# Patient Record
Sex: MALE | Race: NATIVE HAWAIIAN OR OTHER PACIFIC ISLANDER | Employment: UNEMPLOYED | URBAN - NONMETROPOLITAN AREA
[De-identification: names, ages, dates, MRNs, and addresses within clinical notes are randomized per-mention and may not be internally consistent; named-entity substitution may affect disease eponyms.]

---

## 2020-01-21 ENCOUNTER — HOSPITAL ENCOUNTER (EMERGENCY)
Age: 65
Discharge: HOME | End: 2020-01-21
Attending: FAMILY MEDICINE
Payer: MEDICARE

## 2020-01-21 ENCOUNTER — APPOINTMENT (OUTPATIENT)
Dept: GENERAL RADIOLOGY | Age: 65
End: 2020-01-21
Payer: MEDICARE

## 2020-01-21 VITALS
HEIGHT: 68 IN | SYSTOLIC BLOOD PRESSURE: 116 MMHG | DIASTOLIC BLOOD PRESSURE: 71 MMHG | TEMPERATURE: 97.5 F | RESPIRATION RATE: 18 BRPM | HEART RATE: 88 BPM | WEIGHT: 120 LBS | BODY MASS INDEX: 18.19 KG/M2 | OXYGEN SATURATION: 94 %

## 2020-01-21 PROCEDURE — 6370000000 HC RX 637 (ALT 250 FOR IP): Performed by: FAMILY MEDICINE

## 2020-01-21 PROCEDURE — 73590 X-RAY EXAM OF LOWER LEG: CPT

## 2020-01-21 PROCEDURE — 99283 EMERGENCY DEPT VISIT LOW MDM: CPT

## 2020-01-21 PROCEDURE — 73630 X-RAY EXAM OF FOOT: CPT

## 2020-01-21 PROCEDURE — 73610 X-RAY EXAM OF ANKLE: CPT

## 2020-01-21 RX ORDER — OXYCODONE HYDROCHLORIDE AND ACETAMINOPHEN 5; 325 MG/1; MG/1
1 TABLET ORAL EVERY 6 HOURS PRN
Qty: 12 TABLET | Refills: 0 | Status: SHIPPED | OUTPATIENT
Start: 2020-01-21 | End: 2020-01-21

## 2020-01-21 RX ORDER — OXYCODONE HYDROCHLORIDE AND ACETAMINOPHEN 5; 325 MG/1; MG/1
1 TABLET ORAL ONCE
Status: COMPLETED | OUTPATIENT
Start: 2020-01-21 | End: 2020-01-21

## 2020-01-21 RX ORDER — OXYCODONE HYDROCHLORIDE AND ACETAMINOPHEN 5; 325 MG/1; MG/1
1 TABLET ORAL EVERY 6 HOURS PRN
Qty: 12 TABLET | Refills: 0 | Status: SHIPPED | OUTPATIENT
Start: 2020-01-21 | End: 2020-01-24

## 2020-01-21 RX ADMIN — OXYCODONE HYDROCHLORIDE AND ACETAMINOPHEN 1 TABLET: 5; 325 TABLET ORAL at 05:26

## 2020-01-21 ASSESSMENT — PAIN SCALES - GENERAL
PAINLEVEL_OUTOF10: 10
PAINLEVEL_OUTOF10: 10

## 2020-01-21 ASSESSMENT — PAIN DESCRIPTION - FREQUENCY: FREQUENCY: CONTINUOUS

## 2020-01-21 ASSESSMENT — PAIN DESCRIPTION - DESCRIPTORS: DESCRIPTORS: ACHING;STABBING;BURNING

## 2020-01-21 ASSESSMENT — PAIN DESCRIPTION - PAIN TYPE: TYPE: ACUTE PAIN

## 2020-01-21 ASSESSMENT — PAIN DESCRIPTION - ORIENTATION: ORIENTATION: LEFT

## 2020-01-21 ASSESSMENT — PAIN DESCRIPTION - LOCATION: LOCATION: ANKLE;FOOT

## 2020-01-21 NOTE — ED PROVIDER NOTES
[address] [facility]  eMERGENCY dEPARTMENT eNCOUnter          [address] [facility]       Chief Complaint   Patient presents with    Foot Pain     Pt states that he was trying not to step on his dog and twisted his left foot/ankle yesterday;        Nurses Notes reviewed and I agree except as noted in the HPI. HISTORY OF PRESENT ILLNESS    Tamar Ohara is a 62 y.o. male who presents emergency room via private vehicle, patient states driven by friend, patient complaining of left ankle pain and swelling after tripping trying to avoid stepping on his dog day prior, awoke this morning with significant pain and swelling of the left ankle area. Patient has a history of surgery in his ankle/foot area including hardware placement. Patient rates the pain 10 out of 10, aching and stabbing, worse with any movement. Denies other injury. REVIEW OF SYSTEMS     Review of Systems   All other systems reviewed and are negative. PAST MEDICAL HISTORY    has a past medical history of Arthritis, Asthma, CAD (coronary artery disease), COPD (chronic obstructive pulmonary disease) ([facility] [address] and Nerve damage. SURGICAL HISTORY      has a past surgical history that includes back surgery; Bunionectomy (Left); Rotator cuff repair (Right); joint replacement (Right); hiatal hernia repair; and Abdomen surgery. CURRENT MEDICATIONS       Previous Medications    No medications on file       ALLERGIES     is allergic to aspirin. FAMILY HISTORY     has no family status information on file. family history is not on file. SOCIAL HISTORY      reports that he has been smoking. He has a 40.00 pack-year smoking history. He has never used smokeless tobacco. He reports previous drug use. Drug: Marijuana. He reports that he does not drink alcohol. PHYSICAL EXAM     INITIAL VITALS:  height is 5' 8\" (1.727 m) and weight is 120 lb (54.4 kg). His oral temperature is 97.5 °F (36.4 °C).  His blood pressure is 116/71 and his pulse is 88. His respiration is 18 and oxygen saturation is 94%. Physical Exam   Constitutional: Patient is oriented to person, place, and time. Patient appears well-developed and well-nourished. Patient is active and cooperative. Neck: Full passive range of motion without pain and phonation normal.   Cardiovascular:  Normal rate, regular rhythm and intact distal pulses. Pulses: Right radial pulse  2+   Pulmonary/Chest: Effort normal. No tachypnea and no bradypnea. Abdominal:  Patient without distension  Musculoskeletal:   Focused examination of patient's left lower extremity shows obvious swelling to the lateral aspect of the ankle and foot, extending over the dorsal and midfoot area, tenderness to palpation over the proximal fibula, lateral malleolus, and lateral aspect of the midfoot, no overlying integument aberration, distal foot is warm    Except as otherwise noted, negative acute trauma or deformity,  apparent full range of motion and normal strength all extremities appropriate to age. Neurological: Patient is alert and oriented to person, place, and time. patient displays no tremor. Patient displays no seizure activity. .    Skin: Skin is warm and dry. Patient is not diaphoretic. Psychiatric: Patient has a normal mood and affect. Patient speech is normal and behavior is normal. Cognition and memory are normal.    DIFFERENTIAL DIAGNOSIS:   Fracture sprain strain contusion, hardware displacement    DIAGNOSTIC RESULTS           RADIOLOGY: non-plain film images(s) such as CT, Ultrasound and MRI are read by the radiologist.  XR TIBIA FIBULA LEFT (2 VIEWS)   Preliminary Result   Preliminary report only      IMPRESSION:       No acute fracture or dislocation of the left tibia or fibula is seen. If pain    persists, repeat radiographs recommended in 7-10 days. XR ANKLE LEFT (MIN 3 VIEWS)   Preliminary Result   Preliminary report only      IMPRESSION:       1.  Post surgical changes of are limitations from the emergency room, which this can be prescribed. Give patient 1 Percocet prior to discharge. FINAL IMPRESSION      1. Sprain of left ankle, unspecified ligament, initial encounter          DISPOSITION/PLAN   Discharge    PATIENT REFERRED TO:  Grant Barry DPM  [address] [facility] [address], [facility] [address]    Call       Kojo Andrews MD  [address]  [phone]    Call   As needed      DISCHARGE MEDICATIONS:  New Prescriptions    OXYCODONE-ACETAMINOPHEN (PERCOCET) 5-325 MG PER TABLET    Take 1 tablet by mouth every 6 hours as needed for Pain for up to 3 days. Intended supply: 3 days. Take lowest dose possible to manage pain           Summation      Patient Course: Discharge    ED Medications administered this visit:    Medications   oxyCODONE-acetaminophen (PERCOCET) 5-325 MG per tablet 1 tablet (has no administration in time range)       New Prescriptions from this visit:    New Prescriptions    OXYCODONE-ACETAMINOPHEN (PERCOCET) 5-325 MG PER TABLET    Take 1 tablet by mouth every 6 hours as needed for Pain for up to 3 days. Intended supply: 3 days. Take lowest dose possible to manage pain       Follow-up:  Grant JAVIER Barry  [address] [facility] [address], [facility] [address] [id number]  [phone]    Call       Kojo Andrews MD  [address]  [phone]    Call   As needed        Final Impression:   1.  Sprain of left ankle, unspecified ligament, initial encounter               (Please note that portions of this note were completed with a voice recognition program.  Efforts were made to edit the dictations but occasionally words are mis-transcribed.)    MD Matt Galicia MD  01/21/20 6033

## 2020-03-01 ENCOUNTER — HOSPITAL ENCOUNTER (EMERGENCY)
Age: 65
Discharge: HOME | End: 2020-03-01
Attending: EMERGENCY MEDICINE
Payer: MEDICARE

## 2020-03-01 ENCOUNTER — APPOINTMENT (OUTPATIENT)
Dept: CT IMAGING | Age: 65
End: 2020-03-01
Payer: MEDICARE

## 2020-03-01 VITALS
HEART RATE: 94 BPM | SYSTOLIC BLOOD PRESSURE: 145 MMHG | RESPIRATION RATE: 16 BRPM | DIASTOLIC BLOOD PRESSURE: 76 MMHG | TEMPERATURE: 98.6 F | BODY MASS INDEX: 16.22 KG/M2 | HEIGHT: 68 IN | WEIGHT: 107 LBS | OXYGEN SATURATION: 92 %

## 2020-03-01 PROCEDURE — 96372 THER/PROPH/DIAG INJ SC/IM: CPT

## 2020-03-01 PROCEDURE — 72125 CT NECK SPINE W/O DYE: CPT

## 2020-03-01 PROCEDURE — 99284 EMERGENCY DEPT VISIT MOD MDM: CPT

## 2020-03-01 PROCEDURE — 6360000002 HC RX W HCPCS: Performed by: EMERGENCY MEDICINE

## 2020-03-01 RX ORDER — CYCLOBENZAPRINE HCL 5 MG
5 TABLET ORAL 2 TIMES DAILY PRN
Qty: 10 TABLET | Refills: 0 | Status: SHIPPED | OUTPATIENT
Start: 2020-03-01 | End: 2020-03-21

## 2020-03-01 RX ORDER — KETOROLAC TROMETHAMINE 30 MG/ML
30 INJECTION, SOLUTION INTRAMUSCULAR; INTRAVENOUS ONCE
Status: COMPLETED | OUTPATIENT
Start: 2020-03-01 | End: 2020-03-01

## 2020-03-01 RX ADMIN — KETOROLAC TROMETHAMINE 30 MG: 30 INJECTION, SOLUTION INTRAMUSCULAR at 08:43

## 2020-03-01 ASSESSMENT — PAIN SCALES - GENERAL
PAINLEVEL_OUTOF10: 10
PAINLEVEL_OUTOF10: 10

## 2020-03-01 ASSESSMENT — PAIN DESCRIPTION - ONSET: ONSET: ON-GOING

## 2020-03-01 ASSESSMENT — PAIN DESCRIPTION - PAIN TYPE: TYPE: ACUTE PAIN

## 2020-03-01 ASSESSMENT — PAIN DESCRIPTION - LOCATION: LOCATION: NECK

## 2020-03-01 ASSESSMENT — PAIN DESCRIPTION - PROGRESSION: CLINICAL_PROGRESSION: NOT CHANGED

## 2020-03-01 ASSESSMENT — PAIN DESCRIPTION - FREQUENCY: FREQUENCY: CONTINUOUS

## 2020-03-01 ASSESSMENT — PAIN DESCRIPTION - DESCRIPTORS: DESCRIPTORS: SHARP;STABBING

## 2020-03-01 NOTE — ED PROVIDER NOTES
no rubs   GI:  Soft, nondistended, nontender, no organomegaly, no mass, no rebound, no guarding   :  No costovertebral angle tenderness   Musculoskeletal:  No edema, no tenderness, no deformities. Back- tender to touch   Integument:  Well hydrated   Neurologic: Decrease sensation both feet and hands/fingers, baseline       RADIOLOGY/PROCEDURES    Preliminary report only       IMPRESSION:            1. No fracture of the cervical spine is seen. 2. 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 3. There is multilevel foraminal narrowing at the C3-C4 through C7-T1 level    secondary to uncovertebral arthropathy. 4. Prior ACDF at the C3-C4 through C4-C5 level without evidence of hardware    complication. There is complete osseous fusion across these levels. 5. 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 However, there is    osseous fusion across this level and there is therefore no underlying    instability at this level. Summation      Patient Course: CT scan neck negative, no neurological deficits or worsening neuropathy, toradol 30mg IM given. Rx Flexiril 5mg for home. Discharged to home. Discussed warning signs and told to return for worsening symptoms. ED Medications administered this visit:    Medications   ketorolac (TORADOL) injection 30 mg (30 mg Intramuscular Given 3/1/20 0843)       New Prescriptions from this visit:    New Prescriptions    CYCLOBENZAPRINE (FLEXERIL) 5 MG TABLET    Take 1 tablet by mouth 2 times daily as needed for Muscle spasms       Follow-up:  pcp      return If symptoms worsen        Final Impression:   1.  Neck pain Stable              (Please note that portions of this note were completed with a voice recognition program.  Efforts were made to edit the dictations but occasionally words are mis-transcribed.)      Manfred Bull MD  03/01/20 9177

## 2023-03-09 ENCOUNTER — HOSPITAL ENCOUNTER (OUTPATIENT)
Age: 68
Discharge: HOME | End: 2023-03-09
Payer: MEDICARE

## 2023-03-09 LAB
ERYTHROCYTE [SEDIMENTATION RATE] IN BLOOD BY WESTERGREN METHOD: 19 MM/HR (ref 0–20)
URATE SERPL-MCNC: 6.2 MG/DL (ref 3.4–7)
WBC # BLD AUTO: 9 K/UL (ref 3.5–11)

## 2023-03-09 PROCEDURE — 85048 AUTOMATED LEUKOCYTE COUNT: CPT

## 2023-03-09 PROCEDURE — 36415 COLL VENOUS BLD VENIPUNCTURE: CPT

## 2023-03-09 PROCEDURE — 85652 RBC SED RATE AUTOMATED: CPT

## 2023-03-09 PROCEDURE — 84550 ASSAY OF BLOOD/URIC ACID: CPT

## 2025-01-10 LAB
NON-UH HIE ALANINE AMINOTRANSFERASE:CCNC:PT:SER/PLAS:QN:NO ADDITION OF P-5': 30 INT._UNIT/L (ref 6–46)
NON-UH HIE ALBUMIN/GLOBULIN:MCRTO:PT:SER:QN:: 1.1 (ref 1.1–2.2)
NON-UH HIE ALBUMIN:MCNC:PT:SER/PLAS:QN:: 3 GM/DL (ref 3.3–5)
NON-UH HIE ALKALINE PHOSPHATASE:CCNC:PT:SER/PLAS:QN:: 130 INT._UNIT/L (ref 21–98)
NON-UH HIE ANION GAP:SCNC:PT:SER/PLAS:QN:: 9 MEQ/L (ref 6–16)
NON-UH HIE ASPARTATE AMINOTRANSFERASE:CCNC:PT:SER/PLAS:QN:: 94 INT._UNIT/L (ref 5–43)
NON-UH HIE BASOPHILS/LEUKOCYTES:NFR.DF:PT:BLD:QN:AUTOMATED COUNT: 0.1 E9/L (ref 0–0.2)
NON-UH HIE BASOPHILS:NCNC:PT:BLD:QN:AUTOMATED COUNT: 1.1 % (ref 0–2)
NON-UH HIE BILIRUBIN.GLUCURONIDATED+BILIRUBIN.ALBUMIN BOUND:MCNC:PT:SER/PLA: 0.2 MG/DL (ref 0–0.4)
NON-UH HIE BILIRUBIN.NON-GLUCURONIDATED:MSCNC:PT:SER/PLAS:QN:: 0.5 MG/DL (ref 0.1–0.9)
NON-UH HIE BILIRUBIN:MCNC:PT:SER/PLAS:QN:: 0.7 MG/DL (ref 0–1.1)
NON-UH HIE CALCIUM:MCNC:PT:SER/PLAS:QN:: 8 MG/DL (ref 8.9–11.1)
NON-UH HIE CARBON DIOXIDE:SCNC:PT:SER/PLAS:QN:: 29 MMOL/L (ref 21–31)
NON-UH HIE CHLORIDE:SCNC:PT:SER/PLAS:QN:: 98 MMOL/L (ref 101–111)
NON-UH HIE COAGULATION SURFACE INDUCED:TIME:PT:PPP:QN:COAG: 40.9 SECOND(S) (ref 25.1–36.5)
NON-UH HIE COAGULATION TISSUE FACTOR INDUCED.INR:RELTIME:PT:PPP:QN:COAG: 1.25
NON-UH HIE COAGULATION TISSUE FACTOR INDUCED:TIME:PT:PPP:QN:COAG: 14 SECOND(S) (ref 9.4–12.5)
NON-UH HIE CREATININE:MCNC:PT:SER/PLAS:QN:: 0.8 MG/DL (ref 0.5–1.3)
NON-UH HIE EGFR: 96 ML/MIN/1.73 M2
NON-UH HIE EOSINOPHILS/100 LEUKOCYTES:NFR:PT:BLD:QN:AUTOMATED COUNT: 1.9 % (ref 0–8)
NON-UH HIE EOSINOPHILS:NCNC:PT:BLD:QN:: 0.1 E9/L (ref 0–0.5)
NON-UH HIE ERYTHROCYTE DISTRIBUTION WIDTH:RATIO:PT:RBC:QN:AUTOMATED COUNT: 15.2 % (ref 10.9–14.2)
NON-UH HIE ERYTHROCYTE MEAN CORPUSCULAR HEMOGLOBIN CONCENTRATION:MCNC:PT:RB: 35.3 GM/DL (ref 31.4–36)
NON-UH HIE ERYTHROCYTE MEAN CORPUSCULAR HEMOGLOBIN:ENTMASS:PT:RBC:QN:AUTOMA: 32.7 PG (ref 27–34)
NON-UH HIE ERYTHROCYTE MEAN CORPUSCULAR VOLUME:ENTVOL:PT:RBC:QN:AUTOMATED C: 92.7 FL (ref 80–100)
NON-UH HIE ERYTHROCYTES:NCNC:PT:BLD:QN:AUTOMATED COUNT: 4.2 E12/L (ref 4.3–5.9)
NON-UH HIE ETHANOL LVL: 14 MG/DL
NON-UH HIE GLOBULIN:MCNC:PT:SER:QN:CALCULATED: 2.7 GM/DL (ref 1.4–4)
NON-UH HIE GLUCOSE:MCNC:PT:SER/PLAS:QN:: 99 MG/DL (ref 55–199)
NON-UH HIE HEMATOCRIT:VFR:PT:BLD:QN:AUTOMATED COUNT: 38.6 % (ref 37.7–49)
NON-UH HIE HEMOGLOBIN:MCNC:PT:BLD:QN:: 13.6 GM/DL (ref 13.5–17.5)
NON-UH HIE LACTIC ACID LVL: 1.7 MMOL/L (ref 0.5–2.2)
NON-UH HIE LEUKOCYTES: 7.1 E9/L (ref 4–11)
NON-UH HIE LYMPHOCYTES:NCNC:PT:BLD:QN:: 1.4 E9/L (ref 1–4)
NON-UH HIE LYMPHOCYTES:NCNC:PT:BLD:QN:AUTOMATED COUNT: 19.5 % (ref 14–50)
NON-UH HIE MONOCYTES:NCNC:PT:BLD:QN:AUTOMATED COUNT: 0.8 E9/L (ref 0.2–1)
NON-UH HIE NEUTROPHILS/100 LEUKOCYTES:NFR:PT:BLD:QN:: 65.6 % (ref 36–75)
NON-UH HIE NEUTROPHILS:NCNC:PT:BLD:QN:AUTOMATED COUNT: 4.6 E9/L (ref 2–7.5)
NON-UH HIE PLATELET MEAN VOLUME:ENTVOL:PT:BLD:QN:AUTOMATED COUNT: 7.2 FL (ref 6.4–10.8)
NON-UH HIE PLATELET: 164 E9/L (ref 150–500)
NON-UH HIE POTASSIUM:SCNC:PT:SER/PLAS:QN:: 3.4 MMOL/L (ref 3.5–5.3)
NON-UH HIE PROTEIN:MCNC:PT:SER/PLAS:QN:: 5.7 GM/DL (ref 6–7.8)
NON-UH HIE SODIUM:SCNC:PT:SER/PLAS:QN:: 133 MMOL/L (ref 135–145)
NON-UH HIE TRIACYLGLYCEROL LIPASE:CCNC:PT:SER/PLAS:QN:: 15 UNIT/L (ref 13–58)
NON-UH HIE TROPONIN: 5.6 PG/ML (ref 15.9–38.4)
NON-UH HIE UREA NITROGEN/CREATININE:MRTO:PT:SER/PLAS:QN:: 5 NO UNITS (ref 10–20)
NON-UH HIE UREA NITROGEN:MCNC:PT:SER/PLAS:QN:: 4 MG/DL (ref 5–21)

## 2025-01-11 LAB
NON-UH HIE AMPHETAMINES:PRTHR:PT:URINE:ORD:SCREEN>1000 NG/ML: NEGATIVE
NON-UH HIE BARBITURATES:PRTHR:PT:URINE:ORD:SCREEN: NEGATIVE
NON-UH HIE BENZODIAZEPINES:PRTHR:PT:URINE:ORD:SCREEN: NEGATIVE
NON-UH HIE BILIRUBIN:PRTHR:PT:URINE:ORD:TEST STRIP.AUTOMATED: NEGATIVE MG/DL
NON-UH HIE CANNABINOIDS:PRTHR:PT:URINE:ORD:SCREEN: POSITIVE
NON-UH HIE CLARITY:TYPE:PT:URINE:NOM:: CLEAR
NON-UH HIE CLASS:TYPE:PT:URINE COLLECTION METHOD:NOM:*: ABNORMAL
NON-UH HIE COCAINE:PRTHR:PT:URINE:ORD:: NEGATIVE
NON-UH HIE COLOR:TYPE:PT:URINE:NOM:AUTO: COLORLESS
NON-UH HIE GLUCOSE:PRTHR:PT:URINE:ORD:TEST STRIP: NEGATIVE MG/DL
NON-UH HIE HEMOGLOBIN:MCNC:PT:URINE:SEMIQN:TEST STRIP.AUTOMATED: NEGATIVE MG/DL
NON-UH HIE KETONES:PRTHR:PT:URINE:ORD:TEST STRIP.AUTOMATED: NEGATIVE MG/DL
NON-UH HIE LEUKOCYTE ESTERASE:PRTHR:PT:URINE:ORD:TEST STRIP.AUTOMATED: NEGATIVE CD:4673125267
NON-UH HIE NITRITE:PRTHR:PT:URINE:ORD:TEST STRIP.AUTOMATED: NEGATIVE MG/DL
NON-UH HIE OPIATES:PRTHR:PT:URINE:ORD:SCREEN: NEGATIVE
NON-UH HIE PH:LSCNC:PT:URINE:QN:TEST STRIP: 7 (ref 5–9)
NON-UH HIE PHENCYCLIDINE:PRTHR:PT:URINE:ORD:SCREEN>25 NG/ML: NEGATIVE
NON-UH HIE PROTEIN:PRTHR:PT:URINE:ORD:TEST STRIP: NEGATIVE MG/DL
NON-UH HIE SPECIFIC GRAVITY:RDEN:PT:URINE:QN:TEST STRIP: 1.03 (ref 1–1.03)
NON-UH HIE U FENTANYL: NEGATIVE
NON-UH HIE UROBILINOGEN:MCNC:PT:URINE:SEMIQN:TEST STRIP: NEGATIVE MG/DL

## 2025-04-15 LAB
NON-UH HIE ANION GAP:SCNC:PT:SER/PLAS:QN:: 11 MEQ/L (ref 6–16)
NON-UH HIE BASOPHILS:NCNC:PT:BLD:QN:MANUAL COUNT: 0.1 E9/L (ref 0–0.2)
NON-UH HIE CALCIUM:MCNC:PT:SER/PLAS:QN:: 9.7 MG/DL (ref 8.9–11.1)
NON-UH HIE CARBON DIOXIDE:SCNC:PT:SER/PLAS:QN:: 29 MMOL/L (ref 21–31)
NON-UH HIE CHLORIDE:SCNC:PT:SER/PLAS:QN:: 101 MMOL/L (ref 101–111)
NON-UH HIE COAGULATION SURFACE INDUCED:TIME:PT:PPP:QN:COAG: 33.4 SECOND(S) (ref 25.1–36.5)
NON-UH HIE COAGULATION TISSUE FACTOR INDUCED.INR:RELTIME:PT:PPP:QN:COAG: 1.16
NON-UH HIE COAGULATION TISSUE FACTOR INDUCED:TIME:PT:PPP:QN:COAG: 13 SECOND(S) (ref 9.4–12.5)
NON-UH HIE CREATININE:MCNC:PT:SER/PLAS:QN:: 0.8 MG/DL (ref 0.5–1.3)
NON-UH HIE EGFR: 96 ML/MIN/1.73 M2
NON-UH HIE EOSINOPHILS/100 LEUKOCYTES:NFR:PT:BLD:QN:MANUAL COUNT: 1 % (ref 0–8)
NON-UH HIE EOSINOPHILS:NCNC:PT:BLD:QN:MANUAL COUNT: 0.1 E9/L (ref 0–0.5)
NON-UH HIE ERYTHROCYTE DISTRIBUTION WIDTH:RATIO:PT:RBC:QN:AUTOMATED COUNT: 15.8 % (ref 10.9–14.2)
NON-UH HIE ERYTHROCYTE MEAN CORPUSCULAR HEMOGLOBIN CONCENTRATION:MCNC:PT:RB: 33.8 GM/DL (ref 31.4–36)
NON-UH HIE ERYTHROCYTE MEAN CORPUSCULAR HEMOGLOBIN:ENTMASS:PT:RBC:QN:AUTOMA: 29.8 PG (ref 27–34)
NON-UH HIE ERYTHROCYTE MEAN CORPUSCULAR VOLUME:ENTVOL:PT:RBC:QN:AUTOMATED C: 88.1 FL (ref 80–100)
NON-UH HIE ERYTHROCYTE SIZE:MORPH:PT:BLD:NOM:: NORMAL
NON-UH HIE ERYTHROCYTES:NCNC:PT:BLD:QN:AUTOMATED COUNT: 4.9 E12/L (ref 4.3–5.9)
NON-UH HIE GLUCOSE:MCNC:PT:SER/PLAS:QN:: 124 MG/DL (ref 55–199)
NON-UH HIE HEMATOCRIT:VFR:PT:BLD:QN:AUTOMATED COUNT: 43.5 % (ref 37.7–49)
NON-UH HIE HEMOGLOBIN:MCNC:PT:BLD:QN:: 14.7 GM/DL (ref 13.5–17.5)
NON-UH HIE LEUKOCYTES: 11.2 E9/L (ref 4–11)
NON-UH HIE LYMPHOCYTES:NCNC:PT:BLD:QN:: 0.9 E9/L (ref 1–4)
NON-UH HIE LYMPHOCYTES:NCNC:PT:BLD:QN:MANUAL COUNT: 8 % (ref 14–50)
NON-UH HIE MONOCYTES:NCNC:PT:BLD:QN:MANUAL COUNT: 0.4 E9/L (ref 0.2–1)
NON-UH HIE NATRIURETIC PEPTIDE.B:MCNC:PT:SER/PLAS:QN:: 32 PG/ML (ref 5–80)
NON-UH HIE NEUTROPHILS.SEGMENTED/100 LEUKOCYTES:NFR:PT:BLD:QN:MANUAL COUNT: 86 % (ref 36–75)
NON-UH HIE NEUTROPHILS:NCNC:PT:BLD:QN:MANUAL COUNT: 9.6 E9/L
NON-UH HIE PLATELET MEAN VOLUME:ENTVOL:PT:BLD:QN:AUTOMATED COUNT: 8.5 FL (ref 6.4–10.8)
NON-UH HIE PLATELETS:NCNC:PT:BLD:QN:AUTOMATED COUNT: 159 E9/L (ref 150–500)
NON-UH HIE POTASSIUM:SCNC:PT:SER/PLAS:QN:: 5.1 MMOL/L (ref 3.5–5.3)
NON-UH HIE SODIUM:SCNC:PT:SER/PLAS:QN:: 136 MMOL/L (ref 135–145)
NON-UH HIE TROPONIN: 25.3 PG/ML (ref 15.9–38.4)
NON-UH HIE TROPONIN: 36.9 PG/ML (ref 15.9–38.4)
NON-UH HIE UREA NITROGEN/CREATININE:MRTO:PT:SER/PLAS:QN:: 11 NO UNITS (ref 10–20)
NON-UH HIE UREA NITROGEN:MCNC:PT:SER/PLAS:QN:: 9 MG/DL (ref 5–21)